# Patient Record
Sex: MALE | Race: WHITE | NOT HISPANIC OR LATINO | Employment: OTHER | URBAN - METROPOLITAN AREA
[De-identification: names, ages, dates, MRNs, and addresses within clinical notes are randomized per-mention and may not be internally consistent; named-entity substitution may affect disease eponyms.]

---

## 2023-09-07 PROBLEM — S62.015A CLOSED NONDISPLACED FRACTURE OF DISTAL POLE OF NAVICULAR BONE OF LEFT WRIST: Status: ACTIVE | Noted: 2023-09-07

## 2023-09-07 PROBLEM — D69.6 THROMBOCYTOPENIC DISORDER (CMS-HCC): Status: ACTIVE | Noted: 2023-09-07

## 2023-09-07 PROBLEM — M54.16 LUMBAR RADICULOPATHY: Status: ACTIVE | Noted: 2023-09-07

## 2023-09-07 PROBLEM — M17.12 ARTHRITIS OF KNEE, LEFT: Status: ACTIVE | Noted: 2023-09-07

## 2023-09-07 PROBLEM — E66.811 CLASS 1 OBESITY: Status: ACTIVE | Noted: 2023-09-07

## 2023-09-07 PROBLEM — E66.9 CLASS 1 OBESITY: Status: ACTIVE | Noted: 2023-09-07

## 2023-09-07 PROBLEM — R26.2 DIFFICULTY IN WALKING: Status: ACTIVE | Noted: 2023-09-07

## 2023-09-07 PROBLEM — E88.2 LIPOMATOSIS: Status: ACTIVE | Noted: 2023-09-07

## 2023-09-07 PROBLEM — M10.9 GOUT OF ELBOW: Status: ACTIVE | Noted: 2023-09-07

## 2023-09-07 PROBLEM — H90.11 CONDUCTIVE HEARING LOSS OF RIGHT EAR: Status: ACTIVE | Noted: 2023-09-07

## 2023-09-07 PROBLEM — I10 HYPERTENSION: Status: ACTIVE | Noted: 2023-09-07

## 2023-09-07 PROBLEM — M51.06 INTERVERTEBRAL DISC DISORDER OF LUMBAR REGION WITH MYELOPATHY: Status: ACTIVE | Noted: 2023-09-07

## 2023-09-07 PROBLEM — E66.01 MORBID OBESITY (MULTI): Status: ACTIVE | Noted: 2023-09-07

## 2023-09-07 PROBLEM — K40.90 INGUINAL HERNIA: Status: ACTIVE | Noted: 2023-09-07

## 2023-09-07 PROBLEM — S69.92XA INJURY OF LEFT WRIST: Status: ACTIVE | Noted: 2023-09-07

## 2023-09-07 PROBLEM — G89.29 OTHER CHRONIC PAIN: Status: ACTIVE | Noted: 2023-09-07

## 2023-09-07 RX ORDER — MELOXICAM 15 MG/1
1 TABLET ORAL DAILY
COMMUNITY
Start: 2015-07-23

## 2023-09-07 RX ORDER — AMLODIPINE BESYLATE 10 MG/1
1 TABLET ORAL DAILY
COMMUNITY
Start: 2023-04-14

## 2023-09-07 RX ORDER — OXYCODONE HYDROCHLORIDE 5 MG/1
1 CAPSULE ORAL 2 TIMES DAILY
COMMUNITY
Start: 2017-09-21

## 2023-09-07 RX ORDER — HYDROCHLOROTHIAZIDE 25 MG/1
1 TABLET ORAL DAILY
COMMUNITY
Start: 2017-11-18

## 2023-09-07 RX ORDER — LISINOPRIL AND HYDROCHLOROTHIAZIDE 20; 25 MG/1; MG/1
1 TABLET ORAL DAILY
COMMUNITY
Start: 2023-05-16

## 2023-09-07 RX ORDER — PROBENECID AND COLCHICINE .5; 5 MG/1; MG/1
TABLET ORAL
COMMUNITY

## 2023-09-07 RX ORDER — IBUPROFEN 800 MG/1
1 TABLET ORAL
COMMUNITY
Start: 2015-01-21

## 2023-09-07 RX ORDER — LISINOPRIL 40 MG/1
1 TABLET ORAL DAILY
COMMUNITY
Start: 2015-07-24 | End: 2023-11-16 | Stop reason: SDUPTHER

## 2023-10-02 DIAGNOSIS — M1A.0290 IDIOPATHIC CHRONIC GOUT OF ELBOW WITHOUT TOPHUS, UNSPECIFIED LATERALITY: Primary | ICD-10-CM

## 2023-10-05 RX ORDER — COLCHICINE 0.6 MG/1
0.6 TABLET ORAL 2 TIMES DAILY
Qty: 6 TABLET | Refills: 1 | Status: SHIPPED | OUTPATIENT
Start: 2023-10-05 | End: 2023-10-11

## 2023-10-13 ENCOUNTER — LAB (OUTPATIENT)
Dept: LAB | Facility: LAB | Age: 83
End: 2023-10-13
Payer: MEDICARE

## 2023-10-13 DIAGNOSIS — E55.9 VITAMIN D DEFICIENCY, UNSPECIFIED: Primary | ICD-10-CM

## 2023-10-13 DIAGNOSIS — E66.9 OBESITY, UNSPECIFIED: ICD-10-CM

## 2023-10-13 DIAGNOSIS — R53.83 OTHER FATIGUE: ICD-10-CM

## 2023-10-13 DIAGNOSIS — M1A.9XX0 CHRONIC GOUT WITHOUT TOPHUS, UNSPECIFIED CAUSE, UNSPECIFIED SITE: ICD-10-CM

## 2023-10-13 LAB
25(OH)D3 SERPL-MCNC: 17 NG/ML (ref 31–100)
ALBUMIN SERPL-MCNC: 3.8 G/DL (ref 3.5–5)
ALP BLD-CCNC: 71 U/L (ref 35–125)
ALT SERPL-CCNC: 13 U/L (ref 5–40)
ANION GAP SERPL CALC-SCNC: 11 MMOL/L
AST SERPL-CCNC: 16 U/L (ref 5–40)
BASOPHILS # BLD AUTO: 0.05 X10*3/UL (ref 0–0.1)
BASOPHILS NFR BLD AUTO: 0.8 %
BILIRUB SERPL-MCNC: 0.4 MG/DL (ref 0.1–1.2)
BUN SERPL-MCNC: 34 MG/DL (ref 8–25)
CALCIUM SERPL-MCNC: 10.2 MG/DL (ref 8.5–10.4)
CHLORIDE SERPL-SCNC: 106 MMOL/L (ref 97–107)
CHOLEST SERPL-MCNC: 191 MG/DL (ref 133–200)
CHOLEST/HDLC SERPL: 4.7 {RATIO}
CO2 SERPL-SCNC: 23 MMOL/L (ref 24–31)
CREAT SERPL-MCNC: 1.3 MG/DL (ref 0.4–1.6)
EOSINOPHIL # BLD AUTO: 0.06 X10*3/UL (ref 0–0.4)
EOSINOPHIL NFR BLD AUTO: 1 %
ERYTHROCYTE [DISTWIDTH] IN BLOOD BY AUTOMATED COUNT: 16.5 % (ref 11.5–14.5)
GFR SERPL CREATININE-BSD FRML MDRD: 55 ML/MIN/1.73M*2
GLUCOSE SERPL-MCNC: 118 MG/DL (ref 65–99)
HCT VFR BLD AUTO: 42.7 % (ref 41–52)
HDLC SERPL-MCNC: 41 MG/DL
HGB BLD-MCNC: 13.1 G/DL (ref 13.5–17.5)
IMM GRANULOCYTES # BLD AUTO: 0.03 X10*3/UL (ref 0–0.5)
IMM GRANULOCYTES NFR BLD AUTO: 0.5 % (ref 0–0.9)
LDLC SERPL CALC-MCNC: 122 MG/DL (ref 65–130)
LYMPHOCYTES # BLD AUTO: 1.66 X10*3/UL (ref 0.8–3)
LYMPHOCYTES NFR BLD AUTO: 27.5 %
MCH RBC QN AUTO: 26.4 PG (ref 26–34)
MCHC RBC AUTO-ENTMCNC: 30.7 G/DL (ref 32–36)
MCV RBC AUTO: 86 FL (ref 80–100)
MONOCYTES # BLD AUTO: 0.36 X10*3/UL (ref 0.05–0.8)
MONOCYTES NFR BLD AUTO: 6 %
NEUTROPHILS # BLD AUTO: 3.88 X10*3/UL (ref 1.6–5.5)
NEUTROPHILS NFR BLD AUTO: 64.2 %
NRBC BLD-RTO: 0 /100 WBCS (ref 0–0)
PLATELET # BLD AUTO: 147 X10*3/UL (ref 150–450)
PMV BLD AUTO: 12 FL (ref 7.5–11.5)
POTASSIUM SERPL-SCNC: 4.9 MMOL/L (ref 3.4–5.1)
PROT SERPL-MCNC: 6.3 G/DL (ref 5.9–7.9)
RBC # BLD AUTO: 4.97 X10*6/UL (ref 4.5–5.9)
SODIUM SERPL-SCNC: 140 MMOL/L (ref 133–145)
TRIGL SERPL-MCNC: 139 MG/DL (ref 40–150)
TSH SERPL DL<=0.05 MIU/L-ACNC: 1.57 MIU/L (ref 0.27–4.2)
URATE SERPL-MCNC: 6.9 MG/DL (ref 3.6–7.7)
WBC # BLD AUTO: 6 X10*3/UL (ref 4.4–11.3)

## 2023-10-13 PROCEDURE — 84443 ASSAY THYROID STIM HORMONE: CPT

## 2023-10-13 PROCEDURE — 80053 COMPREHEN METABOLIC PANEL: CPT

## 2023-10-13 PROCEDURE — 36415 COLL VENOUS BLD VENIPUNCTURE: CPT

## 2023-10-13 PROCEDURE — 84550 ASSAY OF BLOOD/URIC ACID: CPT

## 2023-10-13 PROCEDURE — 82306 VITAMIN D 25 HYDROXY: CPT

## 2023-10-13 PROCEDURE — 80061 LIPID PANEL: CPT

## 2023-10-13 PROCEDURE — 85025 COMPLETE CBC W/AUTO DIFF WBC: CPT

## 2023-11-16 DIAGNOSIS — I10 PRIMARY HYPERTENSION: Primary | ICD-10-CM

## 2023-11-19 RX ORDER — LISINOPRIL 40 MG/1
40 TABLET ORAL DAILY
Qty: 30 TABLET | Refills: 2 | Status: SHIPPED | OUTPATIENT
Start: 2023-11-19 | End: 2024-02-17

## 2025-06-16 ENCOUNTER — EVALUATION (OUTPATIENT)
Dept: PHYSICAL THERAPY | Facility: CLINIC | Age: 85
End: 2025-06-16
Payer: MEDICARE

## 2025-06-16 DIAGNOSIS — S72.001A FRACTURE OF UNSPECIFIED PART OF NECK OF RIGHT FEMUR, INITIAL ENCOUNTER FOR CLOSED FRACTURE: ICD-10-CM

## 2025-06-16 DIAGNOSIS — R26.2 DIFFICULTY WALKING: ICD-10-CM

## 2025-06-16 DIAGNOSIS — M54.50 CHRONIC MIDLINE LOW BACK PAIN WITHOUT SCIATICA: Primary | ICD-10-CM

## 2025-06-16 DIAGNOSIS — R29.898 WEAKNESS OF BOTH LOWER EXTREMITIES: ICD-10-CM

## 2025-06-16 DIAGNOSIS — G89.29 CHRONIC MIDLINE LOW BACK PAIN WITHOUT SCIATICA: Primary | ICD-10-CM

## 2025-06-16 PROCEDURE — 97110 THERAPEUTIC EXERCISES: CPT | Mod: GP

## 2025-06-16 PROCEDURE — 97161 PT EVAL LOW COMPLEX 20 MIN: CPT | Mod: GP

## 2025-06-16 NOTE — LETTER
June 17, 2025    Bharat Funez DO  [address] [facility]  [place] [address]    Patient: Suresh Chin   YOB: 1940   Date of Visit: 6/16/2025       Dear Bharat Funez DO  [address] [facility]  [place] [address]    The attached plan of care is being sent to you because your patient’s medical reimbursement requires that you certify the plan of care. Your signature is required to allow uninterrupted insurance coverage.      You may indicate your approval by signing below and faxing this form back to us at Dept Fax: [phone].    Please call Dept: [phone] with any questions or concerns.    Thank you for this referral,        Avni Melendez PT  [address] [address]    Payer: Payor: [employer]IC[employer] / Plan: [employer] MEDICARE / Product Type: *No Product type* /                                                                         Date:     Dear Avni Melendez, PT,     Re: Mr. Suresh Chin, MRN:[id number]    I certify that I have reviewed the attached plan of care and it is medically necessary for Mr. Suresh Chin (1940) who is under my care.          ______________________________________                    _________________  Provider name and credentials                                           Date and time                                                                                           Plan of Care 6/16/25   Effective from: 6/16/2025  Effective to: 9/14/2025    Plan ID: [id number]            Participants as of Finalize on 6/17/2025    Name Type Comments Contact Info    Bharat Funez DO Referring Provider  [phone]       Last Plan Note     Author: Avni Melendez PT Status: Incomplete Last edited: 6/16/2025  2:00 PM       Physical Therapy  Physical Therapy Orthopedic Evaluation    Patient Name: Suresh  Giuseppe  MRN: [id number]  Today's Date: 6/16/2025    Insurance:  Visit number: 1 of [place]  Authorization info: No  Insurance Type: Payor: [employer]IC[employer] / Plan: [employer]IC[employer] / Product Type: *No Product type* /    Current Problem  Problem List Items Addressed This Visit           ICD-10-CM    Difficulty walking R26.2    Chronic midline low back pain without sciatica - Primary M54.50, G89.29    Weakness of both lower extremities R29.898     Other Visit Diagnoses         Codes      Fracture of unspecified part of neck of right femur, initial encounter for closed fracture     S72.001A            General:  General  Reason for Referral: Low back pain, LE weakness, balance problem  Referred By: Bharat Funez DO  Precautions:  Precautions  Precautions Comment: Bringing medication list in next visit  Medical History Form: Reviewed (scanned into chart)    Subjective:   AIXA: Pt reports to an evaluation due to BL knee weakness and low back pain. Pt has a hx of BL knee replacements around 2017. Pt feels like he has weakness in his knees which effects his balance. Pt also has a hx of a lumbar decompression in 2023. 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 Pt will mainly feel his low back when he gets up in the morning. He is currently walking with a walker and forward posture.     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 No  Fever/chills, unexplained weight changes, dizziness/fainting, unexplained change in bowel or bladder functions, unexplained malaise or muscle weakness, night pain/sweats, numbness or tingling    Objective  Palpation/ Observation   Increased lumbar paraspinal tightness upon palpation. Slightly flexed posture with ambulation when ambulating with a walker.     Repeated lumbar flexion: No change  Repeated lumbar extension: Increased pressure but no increase in low back pain     LE MMT  Knee extension- R: 4/5 L: 4/5  Knee flexion- R: 4/5 L:  4/5  Hip flexion- R: 4/5 L: 4/5  Hip abduction- R: 4/5 L: 4/5    Hip adduction - R: 4/5 L: 4/5  Hip extension- R: 4-/5 L: 4-/5    Flexibility  Hamstrings- R: 45 L: 50  Hip flexors- R: increased tightness noted L: increased tightness noted     Outcome Measures:  Other Measures  Lower Extremity Funtional Score (LEFS): 22/80     Treatments:  Access Code: [email or web address]  URL: [email or web address]  Date: 06/17/2025  Prepared by: Avni Melendez    Exercises  - Seated Hip Abduction with Resistance  - 1 x daily - 7 x weekly - 3 sets - 10 reps  - Seated Hip Adduction Isometrics with Ball  - 1 x daily - 7 x weekly - 3 sets - 10 reps  - Supine Lower Trunk Rotation  - 1 x daily - 7 x weekly - 3 sets - 10 reps    EDUCATION: Home exercise program, plan of care, activity modifications, pain management, and injury pathology       Assessment:     Patient is a 85 year old male that presents to physical therapy evaluation today due to complaints of low back pain, BL knee weakness, and decreased balance. Patient impairments include flexibility deficits of lumbar spine, strength deficits in LE musculature, and motor control deficits including decreased  pelvic control. Due to these impairments, the patients has the following functional limitations: pain with getting out of bed, difficulty being on his feet for too long, and an overall decrease in functional mobility. Skilled therapy recommended in order to to address their impairments and progress towards the associated functional goals. Prognosis is fair secondary to their current presentation and client understanding. The pt demonstrates a good understanding of their current plan of care, rehab expectations, and prognosis.          Plan:     Planned Interventions include: therapeutic exercise, self-care home management, manual therapy, therapeutic activities, gait training, neuromuscular coordination, vasopneumatic, dry needling, aquatic therapy  Frequency: 1 x Week  Duration: 8 Weeks  Goals: Set and discussed today  Active       PT Problem       PT Goals       Start:  06/17/25       1. Pt roberta increase all hip MMT to 4+/5 or greater in order to demo an increase in muscular strength   2. Pt will be able to perform repeated lumbar/thoracic flexion, extension, and side bending with no increase in discomfort in order to demo an increase in functional mobility   3. Pt will be able to stand for 1 hour with no increase in pain or discomfort in order to demo an increase in functional ability  4. Pt will increase LEFS with 65/80 or better in order to demo an increase in BL knee function  5. Pt will be able to ascend and descend 10 steps with reciprocal gait pattern and proper knee control in order to demo and increase in functional mobility   6. Pt will demo compliance and independence with HEP                    Plan of care was developed with input and agreement by the patient  Ambulatory Screenings Summary       Screening  Frequency  Date Last Completed   Spiritual and Cultural Beliefs   Screening  each visit or episode of care    Falls Risk Screening  every ambulatory visit 6/17/2025  7:41 AM   Pain Screening   annually at primary care visit     Domestic Violence screening  annually at primary care visit    Elder Abuse Screening  annually at primary care visit    Depression Screening  annually in the primary care setting    Suicide Risk Screening  annually in the primary care setting    Nutrition and Food Insecurity   Screening  at least annually at primary care visit     Key Learner  annually in the primary care setting    Drug Screen     Alcohol Screen     Advance Directive                              Current Participants as of 6/17/2025    Name Type Comments Contact Info    Bharat Funze DO Referring Provider  [phone]    Signature pending

## 2025-06-16 NOTE — PROGRESS NOTES
Physical Therapy  Physical Therapy Orthopedic Evaluation    Patient Name: Suresh Chin  MRN: [id number]  Today's Date: 6/16/2025    Insurance:  Visit number: 1 of [place]  Authorization info: No  Insurance Type: Payor: [employer]ICARE / Plan: [employer] / Product Type: *No Product type* /    Current Problem  Problem List Items Addressed This Visit           ICD-10-CM    Difficulty walking R26.2    Relevant Orders    Follow Up In Physical Therapy    Chronic midline low back pain without sciatica - Primary M54.50, G89.29    Relevant Orders    Follow Up In Physical Therapy    Weakness of both lower extremities R29.898    Relevant Orders    Follow Up In Physical Therapy     Other Visit Diagnoses         Codes      Fracture of unspecified part of neck of right femur, initial encounter for closed fracture     S72.001A    Relevant Orders    Follow Up In Physical Therapy            General:  General  Reason for Referral: Low back pain, LE weakness, balance problem  Referred By: Bharat Funez DO  Precautions:  Precautions  Precautions Comment: Bringing medication list in next visit  Medical History Form: Reviewed (scanned into chart)    Subjective:   AIXA: Pt reports to an evaluation due to BL knee weakness and low back pain. Pt has a hx of BL knee replacements around 2017. Pt feels like he has weakness in his knees which effects his balance. Pt also has a hx of a lumbar decompression in 2023. 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 Pt will mainly feel his low back when he gets up in the morning. He is currently walking with a walker and forward posture.     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 No  Fever/chills, unexplained weight changes, dizziness/fainting, unexplained change in bowel or bladder functions, unexplained malaise or muscle weakness, night pain/sweats, numbness or tingling    Objective   Palpation/ Observation   Increased lumbar paraspinal tightness upon palpation. Slightly flexed posture with ambulation when ambulating with a walker.     Repeated lumbar flexion: No change  Repeated lumbar extension: Increased pressure but no increase in low back pain     LE MMT  Knee extension- R: 4/5 L: 4/5  Knee flexion- R: 4/5 L:  4/5  Hip flexion- R: 4/5 L: 4/5  Hip abduction- R: 4/5 L: 4/5    Hip adduction - R: 4/5 L: 4/5  Hip extension- R: 4-/5 L: 4-/5    Flexibility  Hamstrings- R: 45 L: 50  Hip flexors- R: increased tightness noted L: increased tightness noted     Outcome Measures:  Other Measures  Lower Extremity Funtional Score (LEFS): 22/80     Treatments:  Access Code: [email or web address]  URL: [email or web address]  Date: 06/17/2025  Prepared by: Avni Meelndez    Exercises  - Seated Hip Abduction with Resistance  - 1 x daily - 7 x weekly - 3 sets - 10 reps  - Seated Hip Adduction Isometrics with Ball  - 1 x daily - 7 x weekly - 3 sets - 10 reps  - Supine Lower Trunk Rotation  - 1 x daily - 7 x weekly - 3 sets - 10 reps    EDUCATION: Home exercise program, plan of care, activity modifications, pain management, and injury pathology       Assessment:     Patient is a 85 year  old male that presents to physical therapy evaluation today due to complaints of low back pain, BL knee weakness, and decreased balance. Patient impairments include flexibility deficits of lumbar spine, strength deficits in LE musculature, and motor control deficits including decreased pelvic control. Due to these impairments, the patients has the following functional limitations: pain with getting out of bed, difficulty being on his feet for too long, and an overall decrease in functional mobility. Skilled therapy recommended in order to to address their impairments and progress towards the associated functional goals. Prognosis is fair secondary to their current presentation and client understanding. The pt demonstrates a good understanding of their current plan of care, rehab expectations, and prognosis.          Plan:     Planned Interventions include: therapeutic exercise, self-care home management, manual therapy, therapeutic activities, gait training, neuromuscular coordination, vasopneumatic, dry needling, aquatic therapy  Frequency: 1 x Week  Duration: 8 Weeks  Goals: Set and discussed today  Active       PT Problem       PT Goals       Start:  06/17/25       1. Pt roberta increase all hip MMT to 4+/5 or greater in order to demo an increase in muscular strength   2. Pt will be able to perform repeated lumbar/thoracic flexion, extension, and side bending with no increase in discomfort in order to demo an increase in functional mobility   3. Pt will be able to stand for 1 hour with no increase in pain or discomfort in order to demo an increase in functional ability  4. Pt will increase LEFS with 65/80 or better in order to demo an increase in BL knee function  5. Pt will be able to ascend and descend 10 steps with reciprocal gait pattern and proper knee control in order to demo and increase in functional mobility   6. Pt will demo compliance and independence with HEP                    Plan of care was developed  with input and agreement by the patient  Ambulatory Screenings Summary       Screening  Frequency  Date Last Completed   Spiritual and Cultural Beliefs   Screening  each visit or episode of care    Falls Risk Screening  every ambulatory visit 6/17/2025  7:41 AM   Pain Screening  annually at primary care visit     Domestic Violence screening  annually at primary care visit    Elder Abuse Screening  annually at primary care visit    Depression Screening  annually in the primary care setting    Suicide Risk Screening  annually in the primary care setting    Nutrition and Food Insecurity   Screening  at least annually at primary care visit     Key Learner  annually in the primary care setting    Drug Screen     Alcohol Screen     Advance Directive         Time Calculation  Start Time: 1400  Stop Time: 1445  Time Calculation (min): 45 min  PT Evaluation Time Entry  PT Evaluation (Low) Time Entry: 25 PT Therapeutic Procedures Time Entry  Therapeutic Exercise Time Entry: 15

## 2025-06-17 PROBLEM — R29.898 WEAKNESS OF BOTH LOWER EXTREMITIES: Status: ACTIVE | Noted: 2025-06-17

## 2025-06-17 PROBLEM — M54.50 CHRONIC MIDLINE LOW BACK PAIN WITHOUT SCIATICA: Status: ACTIVE | Noted: 2023-09-07

## 2025-06-17 ASSESSMENT — ENCOUNTER SYMPTOMS
OCCASIONAL FEELINGS OF UNSTEADINESS: 1
LOSS OF SENSATION IN FEET: 0
DEPRESSION: 0

## 2025-07-21 ENCOUNTER — TREATMENT (OUTPATIENT)
Dept: PHYSICAL THERAPY | Facility: CLINIC | Age: 85
End: 2025-07-21
Payer: MEDICARE

## 2025-07-21 DIAGNOSIS — G89.29 CHRONIC MIDLINE LOW BACK PAIN WITHOUT SCIATICA: ICD-10-CM

## 2025-07-21 DIAGNOSIS — M54.50 CHRONIC MIDLINE LOW BACK PAIN WITHOUT SCIATICA: ICD-10-CM

## 2025-07-21 DIAGNOSIS — S72.001A FRACTURE OF UNSPECIFIED PART OF NECK OF RIGHT FEMUR, INITIAL ENCOUNTER FOR CLOSED FRACTURE: ICD-10-CM

## 2025-07-21 DIAGNOSIS — R26.2 DIFFICULTY WALKING: ICD-10-CM

## 2025-07-21 DIAGNOSIS — R29.898 WEAKNESS OF BOTH LOWER EXTREMITIES: ICD-10-CM

## 2025-07-21 PROCEDURE — 97110 THERAPEUTIC EXERCISES: CPT | Mod: GP

## 2025-07-21 NOTE — PROGRESS NOTES
Physical Therapy  Physical Therapy Treatment Note    Patient Name: Suresh Chin  MRN: [id number]  Today's Date: 7/21/2025  Time Calculation  Start Time: 0830  Stop Time: 0915  Time Calculation (min): 45 min  PT Therapeutic Procedures Time Entry  Therapeutic Exercise Time Entry: 40        Insurance:  Visit number: 2 of MN  Authorization info: No auth   Insurance Type: Payor: [employer] MEDICARE / Plan: [employer] MEDICARE / Product Type: *No Product type* /   Current Problem  1. Fracture of unspecified part of neck of right femur, initial encounter for closed fracture  Follow Up In Physical Therapy      2. Chronic midline low back pain without sciatica  Follow Up In Physical Therapy      3. Weakness of both lower extremities  Follow Up In Physical Therapy      4. Difficulty walking  Follow Up In Physical Therapy        General  Reason for Referral: Low back pain, LE weakness, balance problem  Referred By: Bharat Funez, DO  Precautions  Precautions  Precautions Comment: Bringing medication list in next visit  Subjective:     Patient reports that he is having surgery on Friday and may need to cancel some appointments. He feels like his knees are feeling good but his low back still gives him some problems. He tried to keep up with Hep as much as possible   Pain     Objective:   Palpation/ Observation   Increased lumbar paraspinal tightness upon palpation. Slightly flexed posture with ambulation when ambulating with a walker.      Repeated lumbar flexion: No change  Repeated lumbar extension: Increased pressure but no increase in low back pain      LE MMT  Knee extension- R: 4/5 L: 4/5  Knee flexion- R: 4/5 L:  4/5  Hip flexion- R: 4/5 L: 4/5  Hip abduction- R: 4/5 L: 4/5    Hip adduction - R: 4/5 L: 4/5  Hip extension- R: 4-/5 L: 4-/5     Flexibility  Hamstrings- R: 45 L: 50  Hip flexors- R: increased tightness noted L: increased tightness noted      Outcome Measures:  Other Measures  Lower Extremity Funtional Score (LEFS): 22/80    Treatments:     THERE EX  Seated adduction 3 x 10   Seated clamshell 3 x 10 (green)   Seated lumbar flexion 3 x 10   Seated LAQ (4#) 3 x 10   Seated hamstring curl 3 x 10 (green)     MANUAL        Assessment:  Pt tolerated session well. Pt was able to continue to work on LE strengthening and lumbar flexibility without any increase in low back pain. Pt continues to progress towards goals established in the POC and should continue with skilled therapy.       Plan: Continue to progress LE strength and lumbar flexibility      Goals:  Active       PT Problem       PT Goals       Start:  06/17/25       1. Pt roberta increase all hip MMT to 4+/5 or greater in order to demo an increase in muscular strength   2. Pt will be able to perform repeated lumbar/thoracic flexion, extension, and side bending with no increase in discomfort in order to demo an increase in functional mobility   3. Pt will be able to stand for 1 hour with no increase in pain or discomfort in order to demo an increase in functional ability  4. Pt will increase LEFS with 65/80 or better in order to demo an increase in BL knee function  5. Pt will be able to ascend and descend 10 steps with reciprocal gait pattern and proper knee control in order to demo and increase in functional mobility   6. Pt will demo compliance and independence with HEP

## 2025-07-28 ENCOUNTER — TREATMENT (OUTPATIENT)
Dept: PHYSICAL THERAPY | Facility: CLINIC | Age: 85
End: 2025-07-28
Payer: MEDICARE

## 2025-07-28 DIAGNOSIS — R29.898 WEAKNESS OF BOTH LOWER EXTREMITIES: ICD-10-CM

## 2025-07-28 DIAGNOSIS — G89.29 CHRONIC MIDLINE LOW BACK PAIN WITHOUT SCIATICA: ICD-10-CM

## 2025-07-28 DIAGNOSIS — M54.50 CHRONIC MIDLINE LOW BACK PAIN WITHOUT SCIATICA: ICD-10-CM

## 2025-07-28 DIAGNOSIS — R26.2 DIFFICULTY WALKING: ICD-10-CM

## 2025-07-28 DIAGNOSIS — S72.001A FRACTURE OF UNSPECIFIED PART OF NECK OF RIGHT FEMUR, INITIAL ENCOUNTER FOR CLOSED FRACTURE: ICD-10-CM

## 2025-07-28 PROCEDURE — 97110 THERAPEUTIC EXERCISES: CPT | Mod: GP

## 2025-07-28 NOTE — PROGRESS NOTES
Physical Therapy  Physical Therapy Treatment Note    Patient Name: Suresh Chin  MRN: [id number]  Today's Date: 7/28/2025  Time Calculation  Start Time: 0830  Stop Time: 0913  Time Calculation (min): 43 min  PT Therapeutic Procedures Time Entry  Therapeutic Exercise Time Entry: 39        Insurance:  Visit number: 3 of [place]  Authorization info: No auth   Insurance Type: Payor: CIGNA MEDICARE / Plan: [employer] MEDICARE / Product Type: *No Product type* /   Current Problem  1. Fracture of unspecified part of neck of right femur, initial encounter for closed fracture  Follow Up In Physical Therapy      2. Chronic midline low back pain without sciatica  Follow Up In Physical Therapy      3. Weakness of both lower extremities  Follow Up In Physical Therapy      4. Difficulty walking  Follow Up In Physical Therapy        General  Reason for Referral: Low back pain, LE weakness, balance problem  Referred By: Bharat Funez, DO  Precautions  Precautions  Precautions Comment: Bringing medication list in next visit  Subjective:     Patient had a spinal stimulator put into his lumbar on 7/25 for incontinence. Overall he is still feeling well but still has his bandages on. Pt had some soreness after his last visit but it did not last too long.   Pain     Objective:   Palpation/ Observation   Increased lumbar paraspinal tightness upon palpation. Slightly flexed posture with ambulation when ambulating with a walker.      Repeated lumbar flexion: No change  Repeated lumbar extension: Increased pressure but no increase in low back pain      LE MMT  Knee extension- R: 4/5 L: 4/5  Knee flexion- R: 4/5 L:  4/5  Hip flexion- R: 4/5 L: 4/5  Hip abduction- R: 4/5 L: 4/5    Hip adduction - R: 4/5 L: 4/5  Hip extension- R: 4-/5 L: 4-/5     Flexibility  Hamstrings- R: 45 L: 50  Hip flexors- R: increased tightness noted L: increased tightness noted      Outcome Measures:  Other Measures  Lower Extremity Funtional Score (LEFS): 22/80   Treatments:      THERE EX  Seated adduction 3 x 10   Seated clamshell 3 x 10 (green)   Seated LAQ (4#) 3 x 10   Seated hamstring curl 3 x 10 (green)   Seated hip flexion 3 x 10     MANUAL        Assessment:  Pt tolerated session well. Due to recent surgery, exercises were kept in the seated position and resistance was kept light. Pt had no increase in pain during today's session and was educated to continue to monitor his symptoms after his surgery. Pt continues to progress towards goals established in the POC and should continue with skilled therapy.         Plan: Continue to progress LE strength and lumbar flexibility      Goals:  Active       PT Problem       PT Goals       Start:  06/17/25       1. Pt roberta increase all hip MMT to 4+/5 or greater in order to demo an increase in muscular strength   2. Pt will be able to perform repeated lumbar/thoracic flexion, extension, and side bending with no increase in discomfort in order to demo an increase in functional mobility   3. Pt will be able to stand for 1 hour with no increase in pain or discomfort in order to demo an increase in functional ability  4. Pt will increase LEFS with 65/80 or better in order to demo an increase in BL knee function  5. Pt will be able to ascend and descend 10 steps with reciprocal gait pattern and proper knee control in order to demo and increase in functional mobility   6. Pt will demo compliance and independence with HEP

## 2025-07-30 ENCOUNTER — TREATMENT (OUTPATIENT)
Dept: PHYSICAL THERAPY | Facility: CLINIC | Age: 85
End: 2025-07-30
Payer: MEDICARE

## 2025-07-30 DIAGNOSIS — R29.898 WEAKNESS OF BOTH LOWER EXTREMITIES: ICD-10-CM

## 2025-07-30 DIAGNOSIS — M54.50 CHRONIC MIDLINE LOW BACK PAIN WITHOUT SCIATICA: ICD-10-CM

## 2025-07-30 DIAGNOSIS — R26.2 DIFFICULTY WALKING: ICD-10-CM

## 2025-07-30 DIAGNOSIS — G89.29 CHRONIC MIDLINE LOW BACK PAIN WITHOUT SCIATICA: ICD-10-CM

## 2025-07-30 DIAGNOSIS — S72.001A FRACTURE OF UNSPECIFIED PART OF NECK OF RIGHT FEMUR, INITIAL ENCOUNTER FOR CLOSED FRACTURE: ICD-10-CM

## 2025-07-30 PROCEDURE — 97110 THERAPEUTIC EXERCISES: CPT | Mod: GP

## 2025-07-30 NOTE — PROGRESS NOTES
Physical Therapy  Physical Therapy Treatment Note    Patient Name: Suresh Chin  MRN: [id number]  Today's Date: 7/30/2025  Time Calculation  Start Time: 1100  Stop Time: 1145  Time Calculation (min): 45 min  PT Therapeutic Procedures Time Entry  Therapeutic Exercise Time Entry: 40        Insurance:  Visit number: 4 of [place]  Authorization info: No auth   Insurance Type: Payor: [employer] MEDICARE / Plan: [employer] MEDICARE / Product Type: *No Product type* /   Current Problem  1. Fracture of unspecified part of neck of right femur, initial encounter for closed fracture  Follow Up In Physical Therapy      2. Chronic midline low back pain without sciatica  Follow Up In Physical Therapy      3. Weakness of both lower extremities  Follow Up In Physical Therapy      4. Difficulty walking  Follow Up In Physical Therapy        General  Reason for Referral: Low back pain, LE weakness, balance problem  Referred By: Bharat Funez, DO  Precautions  Precautions  Precautions Comment: Bringing medication list in next visit  Subjective:     Patient had one day of pain from his surgery but since then has been okay    Pain     Objective:   Palpation/ Observation   Increased lumbar paraspinal tightness upon palpation. Slightly flexed posture with ambulation when ambulating with a walker.      Repeated lumbar flexion: No change  Repeated lumbar extension: Increased pressure but no increase in low back pain      LE MMT  Knee extension- R: 4/5 L: 4/5  Knee flexion- R: 4/5 L:  4/5  Hip flexion- R: 4/5 L: 4/5  Hip abduction- R: 4/5 L: 4/5    Hip adduction - R: 4/5 L: 4/5  Hip extension- R: 4-/5 L: 4-/5     Flexibility  Hamstrings- R: 45 L: 50  Hip flexors- R: increased tightness noted L: increased tightness noted      Outcome Measures:  Other Measures  Lower Extremity Funtional Score (LEFS): 22/80   Treatments:     THERE EX  Seated lumbar flexion (3 ways) 2 x 10   Seated rows 3 x 10 (light)   Seated adduction 3 x 10   Seated clamshell 3 x 10 (blue)    Seated LAQ (4#) 3 x 10   Seated hamstring curl 3 x 10 (green)       MANUAL        Assessment:  Pt tolerated session well. Pt was able to continue to progress resistance used with all LE exercises. He was also able to get back to lumbar mobility exercises since his surgery. Pt continues to progress towards goals established in the POC and should continue with skilled therapy.           Plan: Continue to progress LE strength and lumbar flexibility      Goals:  Active       PT Problem       PT Goals       Start:  06/17/25       1. Pt roberta increase all hip MMT to 4+/5 or greater in order to demo an increase in muscular strength   2. Pt will be able to perform repeated lumbar/thoracic flexion, extension, and side bending with no increase in discomfort in order to demo an increase in functional mobility   3. Pt will be able to stand for 1 hour with no increase in pain or discomfort in order to demo an increase in functional ability  4. Pt will increase LEFS with 65/80 or better in order to demo an increase in BL knee function  5. Pt will be able to ascend and descend 10 steps with reciprocal gait pattern and proper knee control in order to demo and increase in functional mobility   6. Pt will demo compliance and independence with HEP

## 2025-08-04 ENCOUNTER — TREATMENT (OUTPATIENT)
Dept: PHYSICAL THERAPY | Facility: CLINIC | Age: 85
End: 2025-08-04
Payer: MEDICARE

## 2025-08-04 DIAGNOSIS — S72.001A FRACTURE OF UNSPECIFIED PART OF NECK OF RIGHT FEMUR, INITIAL ENCOUNTER FOR CLOSED FRACTURE: ICD-10-CM

## 2025-08-04 DIAGNOSIS — R26.2 DIFFICULTY WALKING: ICD-10-CM

## 2025-08-04 DIAGNOSIS — M54.50 CHRONIC MIDLINE LOW BACK PAIN WITHOUT SCIATICA: ICD-10-CM

## 2025-08-04 DIAGNOSIS — G89.29 CHRONIC MIDLINE LOW BACK PAIN WITHOUT SCIATICA: ICD-10-CM

## 2025-08-04 DIAGNOSIS — R29.898 WEAKNESS OF BOTH LOWER EXTREMITIES: ICD-10-CM

## 2025-08-04 PROCEDURE — 97110 THERAPEUTIC EXERCISES: CPT | Mod: GP

## 2025-08-04 NOTE — PROGRESS NOTES
Physical Therapy  Physical Therapy Treatment Note    Patient Name: Suresh Chin  MRN: [id number]  Today's Date: 8/4/2025  Time Calculation  Start Time: 1045  Stop Time: 1130  Time Calculation (min): 45 min  PT Therapeutic Procedures Time Entry  Therapeutic Exercise Time Entry: 40        Insurance:  Visit number: 5 of MN  Authorization info: No auth   Insurance Type: Payor: [employer] MEDICARE / Plan: [employer] MEDICARE / Product Type: *No Product type* /   Current Problem  1. Fracture of unspecified part of neck of right femur, initial encounter for closed fracture  Follow Up In Physical Therapy      2. Chronic midline low back pain without sciatica  Follow Up In Physical Therapy      3. Weakness of both lower extremities  Follow Up In Physical Therapy      4. Difficulty walking  Follow Up In Physical Therapy        General  Reason for Referral: Low back pain, LE weakness, balance problem  Referred By: Bharat Funez, DO  Precautions  Precautions  Precautions Comment: Bringing medication list in next visit  Subjective:   Pt verbalized that he has been having some fatigue with his exercises but no increase in pain     Pain     Objective:   Palpation/ Observation   Increased lumbar paraspinal tightness upon palpation. Slightly flexed posture with ambulation when ambulating with a walker.      Repeated lumbar flexion: No change  Repeated lumbar extension: Increased pressure but no increase in low back pain      LE MMT  Knee extension- R: 4/5 L: 4/5  Knee flexion- R: 4/5 L:  4/5  Hip flexion- R: 4/5 L: 4/5  Hip abduction- R: 4/5 L: 4/5    Hip adduction - R: 4/5 L: 4/5  Hip extension- R: 4-/5 L: 4-/5     Flexibility  Hamstrings- R: 45 L: 50  Hip flexors- R: increased tightness noted L: increased tightness noted      Outcome Measures:  Other Measures  Lower Extremity Funtional Score (LEFS): 22/80   Treatments:     THERE EX   Seated adduction 3 x 10   Seated clamshell 3 x 10 (blue)   Seated LAQ (4#) 3 x 10   Seated hamstring curl 3 x  10 (green)   Sit to stand 2 x 8   Side stepping in // bars 3 x down/back       MANUAL        Assessment:  Pt tolerated session well. Pt was able to continue to progress resistance used with all LE exercises. Pt did a great job at keeping upright posture when performing side steps in parallel bars which helped with his low back pain. Pt continues to progress towards goals established in the POC and should continue with skilled therapy.             Plan: Continue to progress LE strength and lumbar flexibility      Goals:  Active       PT Problem       PT Goals       Start:  06/17/25       1. Pt roberta increase all hip MMT to 4+/5 or greater in order to demo an increase in muscular strength   2. Pt will be able to perform repeated lumbar/thoracic flexion, extension, and side bending with no increase in discomfort in order to demo an increase in functional mobility   3. Pt will be able to stand for 1 hour with no increase in pain or discomfort in order to demo an increase in functional ability  4. Pt will increase LEFS with 65/80 or better in order to demo an increase in BL knee function  5. Pt will be able to ascend and descend 10 steps with reciprocal gait pattern and proper knee control in order to demo and increase in functional mobility   6. Pt will demo compliance and independence with HEP

## 2025-08-06 ENCOUNTER — TREATMENT (OUTPATIENT)
Dept: PHYSICAL THERAPY | Facility: CLINIC | Age: 85
End: 2025-08-06
Payer: MEDICARE

## 2025-08-06 DIAGNOSIS — M54.50 CHRONIC MIDLINE LOW BACK PAIN WITHOUT SCIATICA: ICD-10-CM

## 2025-08-06 DIAGNOSIS — S72.001A FRACTURE OF UNSPECIFIED PART OF NECK OF RIGHT FEMUR, INITIAL ENCOUNTER FOR CLOSED FRACTURE: ICD-10-CM

## 2025-08-06 DIAGNOSIS — R29.898 WEAKNESS OF BOTH LOWER EXTREMITIES: ICD-10-CM

## 2025-08-06 DIAGNOSIS — R26.2 DIFFICULTY WALKING: ICD-10-CM

## 2025-08-06 DIAGNOSIS — G89.29 CHRONIC MIDLINE LOW BACK PAIN WITHOUT SCIATICA: ICD-10-CM

## 2025-08-06 PROCEDURE — 97110 THERAPEUTIC EXERCISES: CPT | Mod: GP

## 2025-08-06 NOTE — PROGRESS NOTES
Physical Therapy  Physical Therapy Treatment Note    Patient Name: Suresh Chin  MRN: [id number]  Today's Date: 8/6/2025  Time Calculation  Start Time: 1200  Stop Time: 1245  Time Calculation (min): 45 min  PT Therapeutic Procedures Time Entry  Therapeutic Exercise Time Entry: 40        Insurance:  Visit number: 6 of MN  Authorization info: No auth   Insurance Type: Payor: [employer] MEDICARE / Plan: [employer] MEDICARE / Product Type: *No Product type* /   Current Problem  1. Fracture of unspecified part of neck of right femur, initial encounter for closed fracture  Follow Up In Physical Therapy      2. Chronic midline low back pain without sciatica  Follow Up In Physical Therapy      3. Weakness of both lower extremities  Follow Up In Physical Therapy      4. Difficulty walking  Follow Up In Physical Therapy        General  Reason for Referral: Low back pain, LE weakness, balance problem  Referred By: Bharat Funez, DO  Precautions  Precautions  Precautions Comment: Bringing medication list in next visit  Subjective:   Pt had some soreness after last session but is feeling okay now.     Pain     Objective:   Palpation/ Observation   Increased lumbar paraspinal tightness upon palpation. Slightly flexed posture with ambulation when ambulating with a walker.      Repeated lumbar flexion: No change  Repeated lumbar extension: Increased pressure but no increase in low back pain      LE MMT  Knee extension- R: 4/5 L: 4/5  Knee flexion- R: 4/5 L:  4/5  Hip flexion- R: 4/5 L: 4/5  Hip abduction- R: 4/5 L: 4/5    Hip adduction - R: 4/5 L: 4/5  Hip extension- R: 4-/5 L: 4-/5     Flexibility  Hamstrings- R: 45 L: 50  Hip flexors- R: increased tightness noted L: increased tightness noted      Outcome Measures:  Other Measures  Lower Extremity Funtional Score (LEFS): 22/80   Treatments:     THERE EX   Seated lumbar flexion with swiss ball 2 x 10   Seated adduction 3 x 10   Seated clamshell 3 x 10 (blue)   Seated LAQ (4#) 3 x 10   Seated  "hamstring curl 3 x 10 (blue)   4\" toe tap in // bars 2 x 20         MANUAL        Assessment:  Pt tolerated session well. Pt was able to continue to progress LE strengthening without any increase in knee or low back pain. Pt did well in parallel bars and did not need much UE assistance. Pt continues to progress towards goals established in the POC and should continue with skilled therapy.               Plan: Continue to progress LE strength and lumbar flexibility      Goals:  Active       PT Problem       PT Goals       Start:  06/17/25       1. Pt roberta increase all hip MMT to 4+/5 or greater in order to demo an increase in muscular strength   2. Pt will be able to perform repeated lumbar/thoracic flexion, extension, and side bending with no increase in discomfort in order to demo an increase in functional mobility   3. Pt will be able to stand for 1 hour with no increase in pain or discomfort in order to demo an increase in functional ability  4. Pt will increase LEFS with 65/80 or better in order to demo an increase in BL knee function  5. Pt will be able to ascend and descend 10 steps with reciprocal gait pattern and proper knee control in order to demo and increase in functional mobility   6. Pt will demo compliance and independence with HEP                   "

## 2025-08-21 ENCOUNTER — TREATMENT (OUTPATIENT)
Dept: PHYSICAL THERAPY | Facility: CLINIC | Age: 85
End: 2025-08-21
Payer: MEDICARE

## 2025-08-21 DIAGNOSIS — S72.001A FRACTURE OF UNSPECIFIED PART OF NECK OF RIGHT FEMUR, INITIAL ENCOUNTER FOR CLOSED FRACTURE: ICD-10-CM

## 2025-08-21 DIAGNOSIS — R29.898 WEAKNESS OF BOTH LOWER EXTREMITIES: ICD-10-CM

## 2025-08-21 DIAGNOSIS — G89.29 CHRONIC MIDLINE LOW BACK PAIN WITHOUT SCIATICA: ICD-10-CM

## 2025-08-21 DIAGNOSIS — R26.2 DIFFICULTY WALKING: ICD-10-CM

## 2025-08-21 DIAGNOSIS — M54.50 CHRONIC MIDLINE LOW BACK PAIN WITHOUT SCIATICA: ICD-10-CM

## 2025-08-21 PROCEDURE — 97110 THERAPEUTIC EXERCISES: CPT | Mod: GP

## 2025-09-03 ENCOUNTER — TREATMENT (OUTPATIENT)
Dept: PHYSICAL THERAPY | Facility: CLINIC | Age: 85
End: 2025-09-03
Payer: MEDICARE

## 2025-09-03 DIAGNOSIS — M54.50 CHRONIC MIDLINE LOW BACK PAIN WITHOUT SCIATICA: ICD-10-CM

## 2025-09-03 DIAGNOSIS — S72.001A FRACTURE OF UNSPECIFIED PART OF NECK OF RIGHT FEMUR, INITIAL ENCOUNTER FOR CLOSED FRACTURE: ICD-10-CM

## 2025-09-03 DIAGNOSIS — G89.29 CHRONIC MIDLINE LOW BACK PAIN WITHOUT SCIATICA: ICD-10-CM

## 2025-09-03 DIAGNOSIS — R26.2 DIFFICULTY WALKING: ICD-10-CM

## 2025-09-03 DIAGNOSIS — R29.898 WEAKNESS OF BOTH LOWER EXTREMITIES: ICD-10-CM

## 2025-09-03 PROCEDURE — 97110 THERAPEUTIC EXERCISES: CPT | Mod: GP
